# Patient Record
Sex: FEMALE | Race: WHITE | URBAN - METROPOLITAN AREA
[De-identification: names, ages, dates, MRNs, and addresses within clinical notes are randomized per-mention and may not be internally consistent; named-entity substitution may affect disease eponyms.]

---

## 2017-03-09 ENCOUNTER — HOSPITAL ENCOUNTER (OUTPATIENT)
Dept: OTHER | Age: 67
Discharge: HOME | End: 2017-03-09
Attending: OPHTHALMOLOGY | Admitting: OPHTHALMOLOGY

## 2017-03-09 VITALS
BODY MASS INDEX: 37.67 KG/M2 | HEIGHT: 67 IN | HEART RATE: 69 BPM | RESPIRATION RATE: 16 BRPM | DIASTOLIC BLOOD PRESSURE: 81 MMHG | WEIGHT: 240 LBS | SYSTOLIC BLOOD PRESSURE: 138 MMHG

## 2017-03-09 RX ORDER — SPIRONOLACTONE 25 MG/1
25 TABLET ORAL 2 TIMES DAILY
COMMUNITY

## 2017-03-09 RX ORDER — FUROSEMIDE 80 MG
80 TABLET ORAL 2 TIMES DAILY
COMMUNITY

## 2017-03-09 RX ORDER — SERTRALINE HYDROCHLORIDE 100 MG/1
100 TABLET, FILM COATED ORAL DAILY
COMMUNITY

## 2017-03-09 RX ORDER — ALLOPURINOL 300 MG/1
300 TABLET ORAL DAILY
COMMUNITY

## 2017-03-09 RX ORDER — CALCITRIOL 0.25 UG/1
0.25 CAPSULE, LIQUID FILLED ORAL DAILY
COMMUNITY

## 2017-03-09 RX ORDER — METOPROLOL SUCCINATE 100 MG/1
100 TABLET, EXTENDED RELEASE ORAL DAILY
COMMUNITY

## 2017-03-09 RX ORDER — OMEPRAZOLE 20 MG/1
20 CAPSULE, DELAYED RELEASE ORAL 2 TIMES DAILY
COMMUNITY

## 2017-03-09 RX ORDER — SOFT LENS RINSE,STORE SOLUTION
SOLUTION, NON-ORAL MISCELLANEOUS ONCE
Status: COMPLETED | OUTPATIENT
Start: 2017-03-09 | End: 2017-03-09

## 2017-03-09 RX ORDER — LEVOTHYROXINE SODIUM 0.05 MG/1
50 TABLET ORAL DAILY
COMMUNITY

## 2017-03-09 RX ORDER — PROPARACAINE HYDROCHLORIDE 5 MG/ML
1 SOLUTION/ DROPS OPHTHALMIC ONCE
Status: COMPLETED | OUTPATIENT
Start: 2017-03-09 | End: 2017-03-09

## 2017-03-09 RX ORDER — POTASSIUM CHLORIDE 1.5 G/1.77G
20 POWDER, FOR SOLUTION ORAL 2 TIMES DAILY
COMMUNITY

## 2017-03-09 RX ORDER — ERGOCALCIFEROL (VITAMIN D2) 1250 MCG
50000 CAPSULE ORAL
COMMUNITY

## 2017-03-09 RX ORDER — PHENYLEPHRINE HCL 2.5 %
1 DROPS OPHTHALMIC (EYE) ONCE
Status: COMPLETED | OUTPATIENT
Start: 2017-03-09 | End: 2017-03-09

## 2017-03-09 RX ORDER — APRACLONIDINE HYDROCHLORIDE 5 MG/ML
1 SOLUTION/ DROPS OPHTHALMIC 2 TIMES DAILY PRN
Status: DISCONTINUED | OUTPATIENT
Start: 2017-03-09 | End: 2017-03-10 | Stop reason: HOSPADM

## 2017-03-09 RX ORDER — ASPIRIN 325 MG
325 TABLET ORAL DAILY
COMMUNITY

## 2017-03-09 RX ORDER — TROPICAMIDE 10 MG/ML
1 SOLUTION/ DROPS OPHTHALMIC ONCE
Status: COMPLETED | OUTPATIENT
Start: 2017-03-09 | End: 2017-03-09

## 2017-03-09 RX ADMIN — Medication: at 12:52

## 2017-03-09 RX ADMIN — TROPICAMIDE 1 DROP: 10 SOLUTION/ DROPS OPHTHALMIC at 11:30

## 2017-03-09 RX ADMIN — APRACLONIDINE HYDROCHLORIDE 1 DROP: 5 SOLUTION/ DROPS OPHTHALMIC at 12:53

## 2017-03-09 RX ADMIN — APRACLONIDINE HYDROCHLORIDE 1 DROP: 5 SOLUTION/ DROPS OPHTHALMIC at 11:46

## 2017-03-09 RX ADMIN — PROPARACAINE HYDROCHLORIDE 1 DROP: 5 SOLUTION/ DROPS OPHTHALMIC at 12:50

## 2017-03-09 RX ADMIN — Medication 1 DROP: at 11:35

## 2017-03-09 ASSESSMENT — PAIN - FUNCTIONAL ASSESSMENT: PAIN_FUNCTIONAL_ASSESSMENT: 0-10

## 2017-03-09 ASSESSMENT — PAIN SCALES - GENERAL: PAINLEVEL_OUTOF10: 0

## 2017-03-15 ENCOUNTER — HOSPITAL ENCOUNTER (OUTPATIENT)
Dept: OTHER | Age: 67
Discharge: HOME | End: 2017-03-15
Attending: OPHTHALMOLOGY | Admitting: OPHTHALMOLOGY

## 2017-03-15 VITALS
RESPIRATION RATE: 16 BRPM | HEIGHT: 67 IN | DIASTOLIC BLOOD PRESSURE: 70 MMHG | WEIGHT: 240 LBS | HEART RATE: 64 BPM | BODY MASS INDEX: 37.67 KG/M2 | SYSTOLIC BLOOD PRESSURE: 148 MMHG

## 2017-03-15 RX ORDER — SOFT LENS RINSE,STORE SOLUTION
SOLUTION, NON-ORAL MISCELLANEOUS ONCE
Status: COMPLETED | OUTPATIENT
Start: 2017-03-15 | End: 2017-03-15

## 2017-03-15 RX ORDER — APRACLONIDINE HYDROCHLORIDE 5 MG/ML
1 SOLUTION/ DROPS OPHTHALMIC 2 TIMES DAILY PRN
Status: DISCONTINUED | OUTPATIENT
Start: 2017-03-15 | End: 2017-03-16 | Stop reason: HOSPADM

## 2017-03-15 RX ORDER — TROPICAMIDE 10 MG/ML
1 SOLUTION/ DROPS OPHTHALMIC ONCE
Status: COMPLETED | OUTPATIENT
Start: 2017-03-15 | End: 2017-03-15

## 2017-03-15 RX ORDER — PHENYLEPHRINE HCL 2.5 %
1 DROPS OPHTHALMIC (EYE) ONCE
Status: COMPLETED | OUTPATIENT
Start: 2017-03-15 | End: 2017-03-15

## 2017-03-15 RX ORDER — PROPARACAINE HYDROCHLORIDE 5 MG/ML
1 SOLUTION/ DROPS OPHTHALMIC ONCE
Status: COMPLETED | OUTPATIENT
Start: 2017-03-15 | End: 2017-03-15

## 2017-03-15 RX ADMIN — Medication 1 DROP: at 11:10

## 2017-03-15 RX ADMIN — PROPARACAINE HYDROCHLORIDE 1 DROP: 5 SOLUTION/ DROPS OPHTHALMIC at 12:50

## 2017-03-15 RX ADMIN — APRACLONIDINE HYDROCHLORIDE 1 DROP: 5 SOLUTION/ DROPS OPHTHALMIC at 11:12

## 2017-03-15 RX ADMIN — Medication: at 12:59

## 2017-03-15 RX ADMIN — APRACLONIDINE HYDROCHLORIDE 1 DROP: 5 SOLUTION/ DROPS OPHTHALMIC at 13:00

## 2017-03-15 RX ADMIN — TROPICAMIDE 1 DROP: 10 SOLUTION/ DROPS OPHTHALMIC at 11:07

## 2017-03-15 ASSESSMENT — PAIN - FUNCTIONAL ASSESSMENT
PAIN_FUNCTIONAL_ASSESSMENT: 0-10
PAIN_FUNCTIONAL_ASSESSMENT: 0-10
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
## 2023-06-14 ENCOUNTER — APPOINTMENT (OUTPATIENT)
Dept: CT IMAGING | Age: 73
DRG: 690 | End: 2023-06-14
Attending: INTERNAL MEDICINE
Payer: MEDICARE

## 2023-06-14 ENCOUNTER — APPOINTMENT (OUTPATIENT)
Dept: GENERAL RADIOLOGY | Age: 73
DRG: 690 | End: 2023-06-14
Attending: INTERNAL MEDICINE
Payer: MEDICARE

## 2023-06-14 ENCOUNTER — HOSPITAL ENCOUNTER (INPATIENT)
Age: 73
LOS: 5 days | Discharge: NURSING, REHAB OR LONG-TERM CARE | DRG: 690 | End: 2023-06-19
Attending: INTERNAL MEDICINE | Admitting: INTERNAL MEDICINE
Payer: MEDICARE

## 2023-06-14 DIAGNOSIS — S42.335A CLOSED NONDISPLACED OBLIQUE FRACTURE OF SHAFT OF LEFT HUMERUS, INITIAL ENCOUNTER: Primary | ICD-10-CM

## 2023-06-14 PROBLEM — N39.0 UTI (URINARY TRACT INFECTION): Status: ACTIVE | Noted: 2023-06-14

## 2023-06-14 LAB
ALBUMIN SERPL-MCNC: 4.2 G/DL (ref 3.4–5)
ALP SERPL-CCNC: 66 U/L (ref 40–129)
ALT SERPL-CCNC: 16 U/L (ref 10–40)
AMPHETAMINES UR QL SCN>1000 NG/ML: ABNORMAL
ANION GAP SERPL CALCULATED.3IONS-SCNC: 11 MMOL/L (ref 3–16)
AST SERPL-CCNC: 23 U/L (ref 15–37)
BACTERIA URNS QL MICRO: ABNORMAL /HPF
BARBITURATES UR QL SCN>200 NG/ML: ABNORMAL
BASE EXCESS BLDV CALC-SCNC: 2.8 MMOL/L (ref -3–3)
BASOPHILS # BLD: 0 K/UL (ref 0–0.2)
BASOPHILS NFR BLD: 0.3 %
BENZODIAZ UR QL SCN>200 NG/ML: ABNORMAL
BILIRUB DIRECT SERPL-MCNC: <0.2 MG/DL (ref 0–0.3)
BILIRUB INDIRECT SERPL-MCNC: NORMAL MG/DL (ref 0–1)
BILIRUB SERPL-MCNC: 0.6 MG/DL (ref 0–1)
BILIRUB UR QL STRIP.AUTO: NEGATIVE
BUN SERPL-MCNC: 49 MG/DL (ref 7–20)
CALCIUM SERPL-MCNC: 10.4 MG/DL (ref 8.3–10.6)
CANNABINOIDS UR QL SCN>50 NG/ML: ABNORMAL
CHLORIDE SERPL-SCNC: 100 MMOL/L (ref 99–110)
CLARITY UR: ABNORMAL
CO2 BLDV-SCNC: 67 MMOL/L
CO2 SERPL-SCNC: 27 MMOL/L (ref 21–32)
COCAINE UR QL SCN: ABNORMAL
COHGB MFR BLDV: 4.7 % (ref 0–1.5)
COLOR UR: YELLOW
CREAT SERPL-MCNC: 1.8 MG/DL (ref 0.6–1.2)
DEPRECATED RDW RBC AUTO: 14.8 % (ref 12.4–15.4)
DRUG SCREEN COMMENT UR-IMP: ABNORMAL
EKG ATRIAL RATE: 86 BPM
EKG DIAGNOSIS: NORMAL
EKG P AXIS: 75 DEGREES
EKG P-R INTERVAL: 182 MS
EKG Q-T INTERVAL: 410 MS
EKG QRS DURATION: 160 MS
EKG QTC CALCULATION (BAZETT): 490 MS
EKG R AXIS: -44 DEGREES
EKG T AXIS: 114 DEGREES
EKG VENTRICULAR RATE: 86 BPM
EOSINOPHIL # BLD: 0.1 K/UL (ref 0–0.6)
EOSINOPHIL NFR BLD: 1.2 %
EPI CELLS #/AREA URNS AUTO: 2 /HPF (ref 0–5)
FENTANYL SCREEN, URINE: POSITIVE
GFR SERPLBLD CREATININE-BSD FMLA CKD-EPI: 30 ML/MIN/{1.73_M2}
GLUCOSE BLD-MCNC: 123 MG/DL (ref 70–99)
GLUCOSE BLD-MCNC: 124 MG/DL (ref 70–99)
GLUCOSE BLD-MCNC: 154 MG/DL (ref 70–99)
GLUCOSE BLD-MCNC: 156 MG/DL (ref 70–99)
GLUCOSE BLD-MCNC: 211 MG/DL (ref 70–99)
GLUCOSE SERPL-MCNC: 119 MG/DL (ref 70–99)
GLUCOSE UR STRIP.AUTO-MCNC: NEGATIVE MG/DL
HCO3 BLDV-SCNC: 28.5 MMOL/L (ref 23–29)
HCT VFR BLD AUTO: 37.1 % (ref 36–48)
HGB BLD-MCNC: 12.2 G/DL (ref 12–16)
HGB UR QL STRIP.AUTO: NEGATIVE
HYALINE CASTS #/AREA URNS AUTO: 8 /LPF (ref 0–8)
INR PPP: 1.05 (ref 0.84–1.16)
KETONES UR STRIP.AUTO-MCNC: NEGATIVE MG/DL
LEUKOCYTE ESTERASE UR QL STRIP.AUTO: ABNORMAL
LIPASE SERPL-CCNC: 22 U/L (ref 13–60)
LYMPHOCYTES # BLD: 0.8 K/UL (ref 1–5.1)
LYMPHOCYTES NFR BLD: 8.2 %
MAGNESIUM SERPL-MCNC: 2.1 MG/DL (ref 1.8–2.4)
MCH RBC QN AUTO: 34.2 PG (ref 26–34)
MCHC RBC AUTO-ENTMCNC: 33 G/DL (ref 31–36)
MCV RBC AUTO: 103.9 FL (ref 80–100)
METHADONE UR QL SCN>300 NG/ML: ABNORMAL
METHGB MFR BLDV: 0.4 %
MONOCYTES # BLD: 0.3 K/UL (ref 0–1.3)
MONOCYTES NFR BLD: 2.7 %
NEUTROPHILS # BLD: 8.5 K/UL (ref 1.7–7.7)
NEUTROPHILS NFR BLD: 87.6 %
NITRITE UR QL STRIP.AUTO: NEGATIVE
O2 CT VFR BLDV CALC: 17 VOL %
O2 THERAPY: ABNORMAL
OPIATES UR QL SCN>300 NG/ML: POSITIVE
OXYCODONE UR QL SCN: ABNORMAL
PCO2 BLDV: 47.3 MMHG (ref 40–50)
PCP UR QL SCN>25 NG/ML: ABNORMAL
PERFORMED ON: ABNORMAL
PH BLDV: 7.39 [PH] (ref 7.35–7.45)
PH UR STRIP.AUTO: 5.5 [PH] (ref 5–8)
PH UR STRIP: 5.5 [PH]
PHOSPHATE SERPL-MCNC: 3.8 MG/DL (ref 2.5–4.9)
PLATELET # BLD AUTO: 120 K/UL (ref 135–450)
PMV BLD AUTO: 10.1 FL (ref 5–10.5)
PO2 BLDV: 160 MMHG (ref 25–40)
POTASSIUM SERPL-SCNC: 3.9 MMOL/L (ref 3.5–5.1)
PROT SERPL-MCNC: 7.2 G/DL (ref 6.4–8.2)
PROT UR STRIP.AUTO-MCNC: NEGATIVE MG/DL
PROTHROMBIN TIME: 13.7 SEC (ref 11.5–14.8)
RBC # BLD AUTO: 3.57 M/UL (ref 4–5.2)
RBC CLUMPS #/AREA URNS AUTO: 0 /HPF (ref 0–4)
SAO2 % BLDV: 100 %
SODIUM SERPL-SCNC: 138 MMOL/L (ref 136–145)
SP GR UR STRIP.AUTO: 1.01 (ref 1–1.03)
UA DIPSTICK W REFLEX MICRO PNL UR: YES
URN SPEC COLLECT METH UR: ABNORMAL
UROBILINOGEN UR STRIP-ACNC: 0.2 E.U./DL
WBC # BLD AUTO: 9.7 K/UL (ref 4–11)
WBC #/AREA URNS AUTO: 110 /HPF (ref 0–5)

## 2023-06-14 PROCEDURE — 81001 URINALYSIS AUTO W/SCOPE: CPT

## 2023-06-14 PROCEDURE — 80069 RENAL FUNCTION PANEL: CPT

## 2023-06-14 PROCEDURE — 71045 X-RAY EXAM CHEST 1 VIEW: CPT

## 2023-06-14 PROCEDURE — 82803 BLOOD GASES ANY COMBINATION: CPT

## 2023-06-14 PROCEDURE — 85025 COMPLETE CBC W/AUTO DIFF WBC: CPT

## 2023-06-14 PROCEDURE — 93005 ELECTROCARDIOGRAM TRACING: CPT | Performed by: INTERNAL MEDICINE

## 2023-06-14 PROCEDURE — 1200000000 HC SEMI PRIVATE

## 2023-06-14 PROCEDURE — 6360000002 HC RX W HCPCS: Performed by: INTERNAL MEDICINE

## 2023-06-14 PROCEDURE — 87040 BLOOD CULTURE FOR BACTERIA: CPT

## 2023-06-14 PROCEDURE — 84443 ASSAY THYROID STIM HORMONE: CPT

## 2023-06-14 PROCEDURE — 6370000000 HC RX 637 (ALT 250 FOR IP): Performed by: INTERNAL MEDICINE

## 2023-06-14 PROCEDURE — 83690 ASSAY OF LIPASE: CPT

## 2023-06-14 PROCEDURE — 87493 C DIFF AMPLIFIED PROBE: CPT

## 2023-06-14 PROCEDURE — 93010 ELECTROCARDIOGRAM REPORT: CPT | Performed by: INTERNAL MEDICINE

## 2023-06-14 PROCEDURE — 6370000000 HC RX 637 (ALT 250 FOR IP): Performed by: NURSE PRACTITIONER

## 2023-06-14 PROCEDURE — 80307 DRUG TEST PRSMV CHEM ANLYZR: CPT

## 2023-06-14 PROCEDURE — 85610 PROTHROMBIN TIME: CPT

## 2023-06-14 PROCEDURE — 99222 1ST HOSP IP/OBS MODERATE 55: CPT | Performed by: NURSE PRACTITIONER

## 2023-06-14 PROCEDURE — 6370000000 HC RX 637 (ALT 250 FOR IP): Performed by: HOSPITALIST

## 2023-06-14 PROCEDURE — 73200 CT UPPER EXTREMITY W/O DYE: CPT

## 2023-06-14 PROCEDURE — 72040 X-RAY EXAM NECK SPINE 2-3 VW: CPT

## 2023-06-14 PROCEDURE — 80076 HEPATIC FUNCTION PANEL: CPT

## 2023-06-14 PROCEDURE — 73030 X-RAY EXAM OF SHOULDER: CPT

## 2023-06-14 PROCEDURE — 73060 X-RAY EXAM OF HUMERUS: CPT

## 2023-06-14 PROCEDURE — 72128 CT CHEST SPINE W/O DYE: CPT

## 2023-06-14 PROCEDURE — 2500000003 HC RX 250 WO HCPCS: Performed by: INTERNAL MEDICINE

## 2023-06-14 PROCEDURE — 2580000003 HC RX 258: Performed by: INTERNAL MEDICINE

## 2023-06-14 PROCEDURE — 72070 X-RAY EXAM THORAC SPINE 2VWS: CPT

## 2023-06-14 PROCEDURE — 83735 ASSAY OF MAGNESIUM: CPT

## 2023-06-14 RX ORDER — PANTOPRAZOLE SODIUM 40 MG/1
40 TABLET, DELAYED RELEASE ORAL
Status: DISCONTINUED | OUTPATIENT
Start: 2023-06-14 | End: 2023-06-19 | Stop reason: HOSPADM

## 2023-06-14 RX ORDER — ACETAMINOPHEN 325 MG/1
650 TABLET ORAL 3 TIMES DAILY
Status: DISCONTINUED | OUTPATIENT
Start: 2023-06-14 | End: 2023-06-19 | Stop reason: HOSPADM

## 2023-06-14 RX ORDER — ACETAMINOPHEN 500 MG
1000 TABLET ORAL EVERY 8 HOURS
Status: DISCONTINUED | OUTPATIENT
Start: 2023-06-14 | End: 2023-06-14

## 2023-06-14 RX ORDER — ATORVASTATIN CALCIUM 20 MG/1
20 TABLET, FILM COATED ORAL NIGHTLY
Status: DISCONTINUED | OUTPATIENT
Start: 2023-06-14 | End: 2023-06-19 | Stop reason: HOSPADM

## 2023-06-14 RX ORDER — METHOCARBAMOL 500 MG/1
750 TABLET, FILM COATED ORAL 4 TIMES DAILY
Status: DISCONTINUED | OUTPATIENT
Start: 2023-06-14 | End: 2023-06-19 | Stop reason: HOSPADM

## 2023-06-14 RX ORDER — METOPROLOL SUCCINATE 50 MG/1
100 TABLET, EXTENDED RELEASE ORAL DAILY
Status: DISCONTINUED | OUTPATIENT
Start: 2023-06-14 | End: 2023-06-19 | Stop reason: HOSPADM

## 2023-06-14 RX ORDER — GLUCAGON 1 MG/ML
1 KIT INJECTION PRN
Status: DISCONTINUED | OUTPATIENT
Start: 2023-06-14 | End: 2023-06-19 | Stop reason: HOSPADM

## 2023-06-14 RX ORDER — ASPIRIN 325 MG
325 TABLET ORAL NIGHTLY
Status: DISCONTINUED | OUTPATIENT
Start: 2023-06-14 | End: 2023-06-19 | Stop reason: HOSPADM

## 2023-06-14 RX ORDER — GLIMEPIRIDE 1 MG/1
1 TABLET ORAL
COMMUNITY

## 2023-06-14 RX ORDER — LEVOTHYROXINE SODIUM 88 UG/1
88 TABLET ORAL DAILY
Status: DISCONTINUED | OUTPATIENT
Start: 2023-06-14 | End: 2023-06-19 | Stop reason: HOSPADM

## 2023-06-14 RX ORDER — SPIRONOLACTONE 25 MG/1
25 TABLET ORAL 2 TIMES DAILY
Status: DISCONTINUED | OUTPATIENT
Start: 2023-06-14 | End: 2023-06-14

## 2023-06-14 RX ORDER — ALLOPURINOL 100 MG/1
100 TABLET ORAL DAILY
Status: DISCONTINUED | OUTPATIENT
Start: 2023-06-15 | End: 2023-06-19 | Stop reason: HOSPADM

## 2023-06-14 RX ORDER — FENOFIBRATE 160 MG/1
160 TABLET ORAL DAILY
Status: DISCONTINUED | OUTPATIENT
Start: 2023-06-14 | End: 2023-06-19 | Stop reason: HOSPADM

## 2023-06-14 RX ORDER — OXYCODONE HYDROCHLORIDE 5 MG/1
5 TABLET ORAL EVERY 4 HOURS PRN
Status: DISCONTINUED | OUTPATIENT
Start: 2023-06-14 | End: 2023-06-19 | Stop reason: HOSPADM

## 2023-06-14 RX ORDER — LORAZEPAM 1 MG/1
1 TABLET ORAL EVERY 4 HOURS PRN
Status: DISCONTINUED | OUTPATIENT
Start: 2023-06-14 | End: 2023-06-19 | Stop reason: HOSPADM

## 2023-06-14 RX ORDER — FERROUS SULFATE 325(65) MG
325 TABLET ORAL
COMMUNITY

## 2023-06-14 RX ORDER — NICOTINE 21 MG/24HR
1 PATCH, TRANSDERMAL 24 HOURS TRANSDERMAL DAILY
Status: DISCONTINUED | OUTPATIENT
Start: 2023-06-14 | End: 2023-06-19 | Stop reason: HOSPADM

## 2023-06-14 RX ORDER — DEXTROSE MONOHYDRATE 100 MG/ML
INJECTION, SOLUTION INTRAVENOUS CONTINUOUS PRN
Status: DISCONTINUED | OUTPATIENT
Start: 2023-06-14 | End: 2023-06-19 | Stop reason: HOSPADM

## 2023-06-14 RX ORDER — ALLOPURINOL 300 MG/1
300 TABLET ORAL DAILY
Status: DISCONTINUED | OUTPATIENT
Start: 2023-06-14 | End: 2023-06-14

## 2023-06-14 RX ORDER — INSULIN LISPRO 100 [IU]/ML
0-8 INJECTION, SOLUTION INTRAVENOUS; SUBCUTANEOUS
Status: DISCONTINUED | OUTPATIENT
Start: 2023-06-14 | End: 2023-06-19 | Stop reason: HOSPADM

## 2023-06-14 RX ORDER — INSULIN LISPRO 100 [IU]/ML
0-4 INJECTION, SOLUTION INTRAVENOUS; SUBCUTANEOUS NIGHTLY
Status: DISCONTINUED | OUTPATIENT
Start: 2023-06-14 | End: 2023-06-19 | Stop reason: HOSPADM

## 2023-06-14 RX ADMIN — LEVOTHYROXINE SODIUM 88 MCG: 0.09 TABLET ORAL at 06:49

## 2023-06-14 RX ADMIN — SERTRALINE 50 MG: 50 TABLET, FILM COATED ORAL at 12:34

## 2023-06-14 RX ADMIN — FOLIC ACID: 5 INJECTION, SOLUTION INTRAMUSCULAR; INTRAVENOUS; SUBCUTANEOUS at 12:39

## 2023-06-14 RX ADMIN — PANTOPRAZOLE SODIUM 40 MG: 40 TABLET, DELAYED RELEASE ORAL at 06:49

## 2023-06-14 RX ADMIN — PANTOPRAZOLE SODIUM 40 MG: 40 TABLET, DELAYED RELEASE ORAL at 18:00

## 2023-06-14 RX ADMIN — ACETAMINOPHEN 1000 MG: 500 TABLET ORAL at 14:16

## 2023-06-14 RX ADMIN — FENOFIBRATE 160 MG: 160 TABLET ORAL at 12:34

## 2023-06-14 RX ADMIN — CEFTRIAXONE SODIUM 1000 MG: 1 INJECTION, POWDER, FOR SOLUTION INTRAMUSCULAR; INTRAVENOUS at 06:50

## 2023-06-14 RX ADMIN — METHOCARBAMOL 750 MG: 500 TABLET ORAL at 21:14

## 2023-06-14 RX ADMIN — SPIRONOLACTONE 25 MG: 25 TABLET ORAL at 12:34

## 2023-06-14 RX ADMIN — ASPIRIN 325 MG: 325 TABLET ORAL at 21:14

## 2023-06-14 RX ADMIN — METOPROLOL SUCCINATE 100 MG: 50 TABLET, EXTENDED RELEASE ORAL at 12:34

## 2023-06-14 RX ADMIN — METHOCARBAMOL 750 MG: 500 TABLET ORAL at 18:00

## 2023-06-14 RX ADMIN — ACETAMINOPHEN 650 MG: 325 TABLET ORAL at 18:00

## 2023-06-14 ASSESSMENT — PAIN SCALES - GENERAL
PAINLEVEL_OUTOF10: 6
PAINLEVEL_OUTOF10: 7

## 2023-06-15 LAB
ANION GAP SERPL CALCULATED.3IONS-SCNC: 7 MMOL/L (ref 3–16)
BUN SERPL-MCNC: 53 MG/DL (ref 7–20)
C DIFF TOX A+B STL QL IA: NORMAL
CALCIUM SERPL-MCNC: 10 MG/DL (ref 8.3–10.6)
CHLORIDE SERPL-SCNC: 101 MMOL/L (ref 99–110)
CO2 SERPL-SCNC: 30 MMOL/L (ref 21–32)
CREAT SERPL-MCNC: 1.6 MG/DL (ref 0.6–1.2)
DEPRECATED RDW RBC AUTO: 14.9 % (ref 12.4–15.4)
GFR SERPLBLD CREATININE-BSD FMLA CKD-EPI: 34 ML/MIN/{1.73_M2}
GLUCOSE BLD-MCNC: 114 MG/DL (ref 70–99)
GLUCOSE BLD-MCNC: 133 MG/DL (ref 70–99)
GLUCOSE BLD-MCNC: 155 MG/DL (ref 70–99)
GLUCOSE BLD-MCNC: 220 MG/DL (ref 70–99)
GLUCOSE SERPL-MCNC: 170 MG/DL (ref 70–99)
HCT VFR BLD AUTO: 32.8 % (ref 36–48)
HGB BLD-MCNC: 10.8 G/DL (ref 12–16)
MCH RBC QN AUTO: 34.4 PG (ref 26–34)
MCHC RBC AUTO-ENTMCNC: 33 G/DL (ref 31–36)
MCV RBC AUTO: 104.2 FL (ref 80–100)
PERFORMED ON: ABNORMAL
PLATELET # BLD AUTO: 113 K/UL (ref 135–450)
PMV BLD AUTO: 10 FL (ref 5–10.5)
POTASSIUM SERPL-SCNC: 4 MMOL/L (ref 3.5–5.1)
RBC # BLD AUTO: 3.15 M/UL (ref 4–5.2)
SODIUM SERPL-SCNC: 138 MMOL/L (ref 136–145)
TSH SERPL DL<=0.005 MIU/L-ACNC: 2.84 UIU/ML (ref 0.27–4.2)
WBC # BLD AUTO: 6.7 K/UL (ref 4–11)

## 2023-06-15 PROCEDURE — 97530 THERAPEUTIC ACTIVITIES: CPT

## 2023-06-15 PROCEDURE — 36415 COLL VENOUS BLD VENIPUNCTURE: CPT

## 2023-06-15 PROCEDURE — 85027 COMPLETE CBC AUTOMATED: CPT

## 2023-06-15 PROCEDURE — 80048 BASIC METABOLIC PNL TOTAL CA: CPT

## 2023-06-15 PROCEDURE — 6370000000 HC RX 637 (ALT 250 FOR IP): Performed by: NURSE PRACTITIONER

## 2023-06-15 PROCEDURE — 6370000000 HC RX 637 (ALT 250 FOR IP): Performed by: INTERNAL MEDICINE

## 2023-06-15 PROCEDURE — 87449 NOS EACH ORGANISM AG IA: CPT

## 2023-06-15 PROCEDURE — 97116 GAIT TRAINING THERAPY: CPT

## 2023-06-15 PROCEDURE — 87506 IADNA-DNA/RNA PROBE TQ 6-11: CPT

## 2023-06-15 PROCEDURE — 97166 OT EVAL MOD COMPLEX 45 MIN: CPT

## 2023-06-15 PROCEDURE — 87324 CLOSTRIDIUM AG IA: CPT

## 2023-06-15 PROCEDURE — 1200000000 HC SEMI PRIVATE

## 2023-06-15 PROCEDURE — 97535 SELF CARE MNGMENT TRAINING: CPT

## 2023-06-15 PROCEDURE — 99233 SBSQ HOSP IP/OBS HIGH 50: CPT | Performed by: NURSE PRACTITIONER

## 2023-06-15 PROCEDURE — 97162 PT EVAL MOD COMPLEX 30 MIN: CPT

## 2023-06-15 PROCEDURE — 6370000000 HC RX 637 (ALT 250 FOR IP): Performed by: HOSPITALIST

## 2023-06-15 RX ORDER — GUAIFENESIN 600 MG/1
600 TABLET, EXTENDED RELEASE ORAL 2 TIMES DAILY
Qty: 14 TABLET | Refills: 0 | Status: SHIPPED | OUTPATIENT
Start: 2023-06-15 | End: 2023-06-22

## 2023-06-15 RX ORDER — METHOCARBAMOL 750 MG/1
750 TABLET, FILM COATED ORAL 4 TIMES DAILY
Qty: 20 TABLET | Refills: 0 | Status: SHIPPED | OUTPATIENT
Start: 2023-06-15 | End: 2023-06-20

## 2023-06-15 RX ORDER — HYDROXYZINE HYDROCHLORIDE 10 MG/1
10 TABLET, FILM COATED ORAL 3 TIMES DAILY PRN
Status: DISCONTINUED | OUTPATIENT
Start: 2023-06-15 | End: 2023-06-19 | Stop reason: HOSPADM

## 2023-06-15 RX ORDER — DOXYCYCLINE HYCLATE 100 MG
100 TABLET ORAL 2 TIMES DAILY
Qty: 14 TABLET | Refills: 0 | Status: SHIPPED | OUTPATIENT
Start: 2023-06-15 | End: 2023-06-19 | Stop reason: HOSPADM

## 2023-06-15 RX ORDER — OXYCODONE HYDROCHLORIDE 5 MG/1
5 TABLET ORAL EVERY 8 HOURS PRN
Qty: 9 TABLET | Refills: 0 | Status: SHIPPED | OUTPATIENT
Start: 2023-06-15 | End: 2023-06-18

## 2023-06-15 RX ORDER — ASPIRIN 81 MG/1
81 TABLET, CHEWABLE ORAL DAILY
Qty: 30 TABLET | Refills: 3 | Status: SHIPPED | OUTPATIENT
Start: 2023-06-15

## 2023-06-15 RX ADMIN — ACETAMINOPHEN 650 MG: 325 TABLET ORAL at 10:21

## 2023-06-15 RX ADMIN — ALLOPURINOL 100 MG: 100 TABLET ORAL at 10:23

## 2023-06-15 RX ADMIN — SERTRALINE 50 MG: 50 TABLET, FILM COATED ORAL at 10:22

## 2023-06-15 RX ADMIN — HYDROXYZINE HYDROCHLORIDE 10 MG: 10 TABLET ORAL at 00:22

## 2023-06-15 RX ADMIN — ACETAMINOPHEN 650 MG: 325 TABLET ORAL at 01:30

## 2023-06-15 RX ADMIN — ATORVASTATIN CALCIUM 20 MG: 20 TABLET, FILM COATED ORAL at 21:24

## 2023-06-15 RX ADMIN — OXYCODONE HYDROCHLORIDE 5 MG: 5 TABLET ORAL at 21:24

## 2023-06-15 RX ADMIN — METOPROLOL SUCCINATE 100 MG: 50 TABLET, EXTENDED RELEASE ORAL at 10:21

## 2023-06-15 RX ADMIN — FENOFIBRATE 160 MG: 160 TABLET ORAL at 10:23

## 2023-06-15 RX ADMIN — METHOCARBAMOL 750 MG: 500 TABLET ORAL at 16:39

## 2023-06-15 RX ADMIN — METHOCARBAMOL 750 MG: 500 TABLET ORAL at 10:22

## 2023-06-15 RX ADMIN — PANTOPRAZOLE SODIUM 40 MG: 40 TABLET, DELAYED RELEASE ORAL at 16:39

## 2023-06-15 RX ADMIN — ATORVASTATIN CALCIUM 20 MG: 20 TABLET, FILM COATED ORAL at 01:38

## 2023-06-15 RX ADMIN — METHOCARBAMOL 750 MG: 500 TABLET ORAL at 12:29

## 2023-06-15 RX ADMIN — METHOCARBAMOL 750 MG: 500 TABLET ORAL at 21:24

## 2023-06-15 RX ADMIN — OXYCODONE HYDROCHLORIDE 5 MG: 5 TABLET ORAL at 16:39

## 2023-06-15 RX ADMIN — ASPIRIN 325 MG: 325 TABLET ORAL at 21:24

## 2023-06-15 RX ADMIN — PANTOPRAZOLE SODIUM 40 MG: 40 TABLET, DELAYED RELEASE ORAL at 06:00

## 2023-06-15 RX ADMIN — ACETAMINOPHEN 650 MG: 325 TABLET ORAL at 16:39

## 2023-06-15 RX ADMIN — OXYCODONE HYDROCHLORIDE 5 MG: 5 TABLET ORAL at 10:20

## 2023-06-15 RX ADMIN — HYDROXYZINE HYDROCHLORIDE 10 MG: 10 TABLET ORAL at 16:39

## 2023-06-15 RX ADMIN — LEVOTHYROXINE SODIUM 88 MCG: 0.09 TABLET ORAL at 06:00

## 2023-06-15 RX ADMIN — HYDROXYZINE HYDROCHLORIDE 10 MG: 10 TABLET ORAL at 10:33

## 2023-06-15 ASSESSMENT — PAIN SCALES - GENERAL
PAINLEVEL_OUTOF10: 6
PAINLEVEL_OUTOF10: 2

## 2023-06-15 ASSESSMENT — PAIN DESCRIPTION - ORIENTATION: ORIENTATION: LEFT

## 2023-06-15 ASSESSMENT — PAIN DESCRIPTION - PAIN TYPE: TYPE: ACUTE PAIN

## 2023-06-15 ASSESSMENT — PAIN DESCRIPTION - DESCRIPTORS: DESCRIPTORS: SHARP;SHOOTING

## 2023-06-15 ASSESSMENT — PAIN DESCRIPTION - ONSET: ONSET: GRADUAL

## 2023-06-15 ASSESSMENT — PAIN DESCRIPTION - FREQUENCY: FREQUENCY: INTERMITTENT

## 2023-06-15 ASSESSMENT — PAIN - FUNCTIONAL ASSESSMENT: PAIN_FUNCTIONAL_ASSESSMENT: ACTIVITIES ARE NOT PREVENTED

## 2023-06-15 ASSESSMENT — PAIN DESCRIPTION - LOCATION: LOCATION: ARM

## 2023-06-16 LAB
ALBUMIN SERPL-MCNC: 3.5 G/DL (ref 3.4–5)
ANION GAP SERPL CALCULATED.3IONS-SCNC: 8 MMOL/L (ref 3–16)
BUN SERPL-MCNC: 49 MG/DL (ref 7–20)
C DIFF TOX GENS STL QL NAA+PROBE: ABNORMAL
CALCIUM SERPL-MCNC: 9.9 MG/DL (ref 8.3–10.6)
CHLORIDE SERPL-SCNC: 103 MMOL/L (ref 99–110)
CO2 SERPL-SCNC: 28 MMOL/L (ref 21–32)
CREAT SERPL-MCNC: 1.3 MG/DL (ref 0.6–1.2)
DEPRECATED RDW RBC AUTO: 14.9 % (ref 12.4–15.4)
GFR SERPLBLD CREATININE-BSD FMLA CKD-EPI: 44 ML/MIN/{1.73_M2}
GI PATHOGENS PNL STL NAA+PROBE: NORMAL
GLUCOSE BLD-MCNC: 119 MG/DL (ref 70–99)
GLUCOSE BLD-MCNC: 150 MG/DL (ref 70–99)
GLUCOSE BLD-MCNC: 199 MG/DL (ref 70–99)
GLUCOSE BLD-MCNC: 236 MG/DL (ref 70–99)
GLUCOSE SERPL-MCNC: 111 MG/DL (ref 70–99)
HCT VFR BLD AUTO: 31.5 % (ref 36–48)
HGB BLD-MCNC: 10.7 G/DL (ref 12–16)
MCH RBC QN AUTO: 34.9 PG (ref 26–34)
MCHC RBC AUTO-ENTMCNC: 34 G/DL (ref 31–36)
MCV RBC AUTO: 102.6 FL (ref 80–100)
ORGANISM: ABNORMAL
PERFORMED ON: ABNORMAL
PHOSPHATE SERPL-MCNC: 3.7 MG/DL (ref 2.5–4.9)
PLATELET # BLD AUTO: 97 K/UL (ref 135–450)
PMV BLD AUTO: 10 FL (ref 5–10.5)
POTASSIUM SERPL-SCNC: 4 MMOL/L (ref 3.5–5.1)
RBC # BLD AUTO: 3.07 M/UL (ref 4–5.2)
SODIUM SERPL-SCNC: 139 MMOL/L (ref 136–145)
WBC # BLD AUTO: 5.4 K/UL (ref 4–11)

## 2023-06-16 PROCEDURE — 6370000000 HC RX 637 (ALT 250 FOR IP): Performed by: NURSE PRACTITIONER

## 2023-06-16 PROCEDURE — 97530 THERAPEUTIC ACTIVITIES: CPT

## 2023-06-16 PROCEDURE — 36415 COLL VENOUS BLD VENIPUNCTURE: CPT

## 2023-06-16 PROCEDURE — 6370000000 HC RX 637 (ALT 250 FOR IP): Performed by: INTERNAL MEDICINE

## 2023-06-16 PROCEDURE — 97535 SELF CARE MNGMENT TRAINING: CPT

## 2023-06-16 PROCEDURE — 85027 COMPLETE CBC AUTOMATED: CPT

## 2023-06-16 PROCEDURE — 6370000000 HC RX 637 (ALT 250 FOR IP): Performed by: HOSPITALIST

## 2023-06-16 PROCEDURE — 6370000000 HC RX 637 (ALT 250 FOR IP): Performed by: PHYSICIAN ASSISTANT

## 2023-06-16 PROCEDURE — 1200000000 HC SEMI PRIVATE

## 2023-06-16 PROCEDURE — 80069 RENAL FUNCTION PANEL: CPT

## 2023-06-16 RX ORDER — VANCOMYCIN HYDROCHLORIDE 50 MG/ML
125 KIT ORAL EVERY 6 HOURS SCHEDULED
Status: DISCONTINUED | OUTPATIENT
Start: 2023-06-16 | End: 2023-06-16

## 2023-06-16 RX ORDER — VANCOMYCIN HYDROCHLORIDE 50 MG/ML
125 KIT ORAL EVERY 6 HOURS SCHEDULED
Status: DISCONTINUED | OUTPATIENT
Start: 2023-06-16 | End: 2023-06-19 | Stop reason: HOSPADM

## 2023-06-16 RX ORDER — LIDOCAINE 4 G/G
1 PATCH TOPICAL DAILY
Status: DISCONTINUED | OUTPATIENT
Start: 2023-06-16 | End: 2023-06-17

## 2023-06-16 RX ADMIN — SERTRALINE 50 MG: 50 TABLET, FILM COATED ORAL at 10:05

## 2023-06-16 RX ADMIN — Medication 125 MG: at 13:34

## 2023-06-16 RX ADMIN — METHOCARBAMOL 750 MG: 500 TABLET ORAL at 21:14

## 2023-06-16 RX ADMIN — METOPROLOL SUCCINATE 100 MG: 50 TABLET, EXTENDED RELEASE ORAL at 10:05

## 2023-06-16 RX ADMIN — ACETAMINOPHEN 650 MG: 325 TABLET ORAL at 18:09

## 2023-06-16 RX ADMIN — PANTOPRAZOLE SODIUM 40 MG: 40 TABLET, DELAYED RELEASE ORAL at 06:11

## 2023-06-16 RX ADMIN — METHOCARBAMOL 750 MG: 500 TABLET ORAL at 13:27

## 2023-06-16 RX ADMIN — PANTOPRAZOLE SODIUM 40 MG: 40 TABLET, DELAYED RELEASE ORAL at 18:09

## 2023-06-16 RX ADMIN — METHOCARBAMOL 750 MG: 500 TABLET ORAL at 10:06

## 2023-06-16 RX ADMIN — OXYCODONE HYDROCHLORIDE 5 MG: 5 TABLET ORAL at 10:05

## 2023-06-16 RX ADMIN — ATORVASTATIN CALCIUM 20 MG: 20 TABLET, FILM COATED ORAL at 21:14

## 2023-06-16 RX ADMIN — ACETAMINOPHEN 650 MG: 325 TABLET ORAL at 10:06

## 2023-06-16 RX ADMIN — LEVOTHYROXINE SODIUM 88 MCG: 0.09 TABLET ORAL at 06:12

## 2023-06-16 RX ADMIN — ACETAMINOPHEN 650 MG: 325 TABLET ORAL at 01:58

## 2023-06-16 RX ADMIN — FENOFIBRATE 160 MG: 160 TABLET ORAL at 10:14

## 2023-06-16 RX ADMIN — METHOCARBAMOL 750 MG: 500 TABLET ORAL at 18:09

## 2023-06-16 RX ADMIN — Medication 125 MG: at 18:09

## 2023-06-16 RX ADMIN — ALLOPURINOL 100 MG: 100 TABLET ORAL at 10:05

## 2023-06-16 RX ADMIN — OXYCODONE HYDROCHLORIDE 5 MG: 5 TABLET ORAL at 18:09

## 2023-06-16 RX ADMIN — ASPIRIN 325 MG: 325 TABLET ORAL at 21:14

## 2023-06-16 ASSESSMENT — PAIN - FUNCTIONAL ASSESSMENT: PAIN_FUNCTIONAL_ASSESSMENT: PREVENTS OR INTERFERES SOME ACTIVE ACTIVITIES AND ADLS

## 2023-06-16 ASSESSMENT — PAIN DESCRIPTION - FREQUENCY: FREQUENCY: CONTINUOUS

## 2023-06-16 ASSESSMENT — PAIN DESCRIPTION - PAIN TYPE: TYPE: ACUTE PAIN

## 2023-06-16 ASSESSMENT — PAIN SCALES - GENERAL
PAINLEVEL_OUTOF10: 5
PAINLEVEL_OUTOF10: 10

## 2023-06-16 ASSESSMENT — PAIN DESCRIPTION - LOCATION: LOCATION: BACK;ARM

## 2023-06-16 ASSESSMENT — PAIN DESCRIPTION - ONSET: ONSET: ON-GOING

## 2023-06-16 ASSESSMENT — PAIN DESCRIPTION - ORIENTATION: ORIENTATION: LEFT

## 2023-06-17 ENCOUNTER — APPOINTMENT (OUTPATIENT)
Dept: CT IMAGING | Age: 73
DRG: 690 | End: 2023-06-17
Attending: INTERNAL MEDICINE
Payer: MEDICARE

## 2023-06-17 LAB
ALBUMIN SERPL-MCNC: 3.8 G/DL (ref 3.4–5)
ANION GAP SERPL CALCULATED.3IONS-SCNC: 9 MMOL/L (ref 3–16)
BUN SERPL-MCNC: 46 MG/DL (ref 7–20)
CALCIUM SERPL-MCNC: 10 MG/DL (ref 8.3–10.6)
CHLORIDE SERPL-SCNC: 104 MMOL/L (ref 99–110)
CO2 SERPL-SCNC: 26 MMOL/L (ref 21–32)
CREAT SERPL-MCNC: 1.3 MG/DL (ref 0.6–1.2)
DEPRECATED RDW RBC AUTO: 14.6 % (ref 12.4–15.4)
GFR SERPLBLD CREATININE-BSD FMLA CKD-EPI: 44 ML/MIN/{1.73_M2}
GLUCOSE BLD-MCNC: 141 MG/DL (ref 70–99)
GLUCOSE BLD-MCNC: 148 MG/DL (ref 70–99)
GLUCOSE BLD-MCNC: 196 MG/DL (ref 70–99)
GLUCOSE BLD-MCNC: 264 MG/DL (ref 70–99)
GLUCOSE SERPL-MCNC: 126 MG/DL (ref 70–99)
HCT VFR BLD AUTO: 33.1 % (ref 36–48)
HGB BLD-MCNC: 10.9 G/DL (ref 12–16)
MCH RBC QN AUTO: 34.2 PG (ref 26–34)
MCHC RBC AUTO-ENTMCNC: 33 G/DL (ref 31–36)
MCV RBC AUTO: 103.9 FL (ref 80–100)
PERFORMED ON: ABNORMAL
PHOSPHATE SERPL-MCNC: 3.5 MG/DL (ref 2.5–4.9)
PLATELET # BLD AUTO: 119 K/UL (ref 135–450)
PMV BLD AUTO: 10.1 FL (ref 5–10.5)
POTASSIUM SERPL-SCNC: 4.5 MMOL/L (ref 3.5–5.1)
RBC # BLD AUTO: 3.19 M/UL (ref 4–5.2)
SODIUM SERPL-SCNC: 139 MMOL/L (ref 136–145)
WBC # BLD AUTO: 5.3 K/UL (ref 4–11)

## 2023-06-17 PROCEDURE — 6370000000 HC RX 637 (ALT 250 FOR IP): Performed by: INTERNAL MEDICINE

## 2023-06-17 PROCEDURE — 6370000000 HC RX 637 (ALT 250 FOR IP): Performed by: HOSPITALIST

## 2023-06-17 PROCEDURE — 2580000003 HC RX 258: Performed by: PHYSICIAN ASSISTANT

## 2023-06-17 PROCEDURE — 3209999900 CT LUMBAR SPINE TRAUMA RECONSTRUCTION

## 2023-06-17 PROCEDURE — 1200000000 HC SEMI PRIVATE

## 2023-06-17 PROCEDURE — 80069 RENAL FUNCTION PANEL: CPT

## 2023-06-17 PROCEDURE — 97530 THERAPEUTIC ACTIVITIES: CPT

## 2023-06-17 PROCEDURE — 74176 CT ABD & PELVIS W/O CONTRAST: CPT

## 2023-06-17 PROCEDURE — 6370000000 HC RX 637 (ALT 250 FOR IP): Performed by: PHYSICIAN ASSISTANT

## 2023-06-17 PROCEDURE — 6370000000 HC RX 637 (ALT 250 FOR IP): Performed by: NURSE PRACTITIONER

## 2023-06-17 PROCEDURE — 85027 COMPLETE CBC AUTOMATED: CPT

## 2023-06-17 PROCEDURE — 97535 SELF CARE MNGMENT TRAINING: CPT

## 2023-06-17 RX ORDER — LIDOCAINE 4 G/G
1 PATCH TOPICAL DAILY
Status: DISCONTINUED | OUTPATIENT
Start: 2023-06-17 | End: 2023-06-19 | Stop reason: HOSPADM

## 2023-06-17 RX ORDER — SODIUM CHLORIDE 9 MG/ML
INJECTION, SOLUTION INTRAVENOUS CONTINUOUS
Status: ACTIVE | OUTPATIENT
Start: 2023-06-17 | End: 2023-06-17

## 2023-06-17 RX ADMIN — FENOFIBRATE 160 MG: 160 TABLET ORAL at 09:03

## 2023-06-17 RX ADMIN — PANTOPRAZOLE SODIUM 40 MG: 40 TABLET, DELAYED RELEASE ORAL at 15:32

## 2023-06-17 RX ADMIN — METHOCARBAMOL 750 MG: 500 TABLET ORAL at 17:31

## 2023-06-17 RX ADMIN — Medication 125 MG: at 06:12

## 2023-06-17 RX ADMIN — ALLOPURINOL 100 MG: 100 TABLET ORAL at 09:03

## 2023-06-17 RX ADMIN — METHOCARBAMOL 750 MG: 500 TABLET ORAL at 12:10

## 2023-06-17 RX ADMIN — SODIUM CHLORIDE: 9 INJECTION, SOLUTION INTRAVENOUS at 17:36

## 2023-06-17 RX ADMIN — ASPIRIN 325 MG: 325 TABLET ORAL at 20:35

## 2023-06-17 RX ADMIN — ACETAMINOPHEN 650 MG: 325 TABLET ORAL at 17:36

## 2023-06-17 RX ADMIN — ACETAMINOPHEN 650 MG: 325 TABLET ORAL at 09:03

## 2023-06-17 RX ADMIN — LEVOTHYROXINE SODIUM 88 MCG: 0.09 TABLET ORAL at 06:12

## 2023-06-17 RX ADMIN — METOPROLOL SUCCINATE 100 MG: 50 TABLET, EXTENDED RELEASE ORAL at 09:02

## 2023-06-17 RX ADMIN — ACETAMINOPHEN 650 MG: 325 TABLET ORAL at 01:30

## 2023-06-17 RX ADMIN — OXYCODONE HYDROCHLORIDE 5 MG: 5 TABLET ORAL at 15:32

## 2023-06-17 RX ADMIN — METHOCARBAMOL 750 MG: 500 TABLET ORAL at 09:02

## 2023-06-17 RX ADMIN — Medication 125 MG: at 17:31

## 2023-06-17 RX ADMIN — OXYCODONE HYDROCHLORIDE 5 MG: 5 TABLET ORAL at 20:45

## 2023-06-17 RX ADMIN — METHOCARBAMOL 750 MG: 500 TABLET ORAL at 20:35

## 2023-06-17 RX ADMIN — PANTOPRAZOLE SODIUM 40 MG: 40 TABLET, DELAYED RELEASE ORAL at 06:12

## 2023-06-17 RX ADMIN — Medication 125 MG: at 12:10

## 2023-06-17 RX ADMIN — SERTRALINE 50 MG: 50 TABLET, FILM COATED ORAL at 09:03

## 2023-06-17 RX ADMIN — ATORVASTATIN CALCIUM 20 MG: 20 TABLET, FILM COATED ORAL at 20:35

## 2023-06-17 RX ADMIN — Medication 125 MG: at 00:21

## 2023-06-17 ASSESSMENT — PAIN SCALES - GENERAL
PAINLEVEL_OUTOF10: 2
PAINLEVEL_OUTOF10: 0
PAINLEVEL_OUTOF10: 3
PAINLEVEL_OUTOF10: 3
PAINLEVEL_OUTOF10: 4
PAINLEVEL_OUTOF10: 4

## 2023-06-17 ASSESSMENT — PAIN DESCRIPTION - LOCATION
LOCATION: BACK;ABDOMEN
LOCATION: ARM
LOCATION: BACK;ABDOMEN

## 2023-06-17 ASSESSMENT — PAIN DESCRIPTION - DESCRIPTORS: DESCRIPTORS: ACHING

## 2023-06-17 ASSESSMENT — PAIN SCALES - WONG BAKER: WONGBAKER_NUMERICALRESPONSE: 2

## 2023-06-17 ASSESSMENT — PAIN DESCRIPTION - ORIENTATION: ORIENTATION: LEFT

## 2023-06-18 ENCOUNTER — APPOINTMENT (OUTPATIENT)
Dept: GENERAL RADIOLOGY | Age: 73
DRG: 690 | End: 2023-06-18
Attending: INTERNAL MEDICINE
Payer: MEDICARE

## 2023-06-18 LAB
ALBUMIN SERPL-MCNC: 3.5 G/DL (ref 3.4–5)
ANION GAP SERPL CALCULATED.3IONS-SCNC: 8 MMOL/L (ref 3–16)
BACTERIA BLD CULT ORG #2: NORMAL
BACTERIA BLD CULT: NORMAL
BASOPHILS # BLD: 0 K/UL (ref 0–0.2)
BASOPHILS NFR BLD: 0.6 %
BUN SERPL-MCNC: 41 MG/DL (ref 7–20)
CALCIUM SERPL-MCNC: 9.8 MG/DL (ref 8.3–10.6)
CHLORIDE SERPL-SCNC: 107 MMOL/L (ref 99–110)
CO2 SERPL-SCNC: 26 MMOL/L (ref 21–32)
CREAT SERPL-MCNC: 1.1 MG/DL (ref 0.6–1.2)
DEPRECATED RDW RBC AUTO: 14.7 % (ref 12.4–15.4)
EOSINOPHIL # BLD: 0.1 K/UL (ref 0–0.6)
EOSINOPHIL NFR BLD: 2.8 %
GFR SERPLBLD CREATININE-BSD FMLA CKD-EPI: 54 ML/MIN/{1.73_M2}
GLUCOSE BLD-MCNC: 125 MG/DL (ref 70–99)
GLUCOSE BLD-MCNC: 177 MG/DL (ref 70–99)
GLUCOSE BLD-MCNC: 214 MG/DL (ref 70–99)
GLUCOSE BLD-MCNC: 239 MG/DL (ref 70–99)
GLUCOSE SERPL-MCNC: 155 MG/DL (ref 70–99)
HCT VFR BLD AUTO: 32.3 % (ref 36–48)
HGB BLD-MCNC: 10.7 G/DL (ref 12–16)
LYMPHOCYTES # BLD: 0.8 K/UL (ref 1–5.1)
LYMPHOCYTES NFR BLD: 15.8 %
MCH RBC QN AUTO: 34.3 PG (ref 26–34)
MCHC RBC AUTO-ENTMCNC: 33.1 G/DL (ref 31–36)
MCV RBC AUTO: 103.4 FL (ref 80–100)
MONOCYTES # BLD: 0.3 K/UL (ref 0–1.3)
MONOCYTES NFR BLD: 5.6 %
NEUTROPHILS # BLD: 3.7 K/UL (ref 1.7–7.7)
NEUTROPHILS NFR BLD: 75.2 %
NT-PROBNP SERPL-MCNC: 1544 PG/ML (ref 0–124)
PERFORMED ON: ABNORMAL
PHOSPHATE SERPL-MCNC: 2.7 MG/DL (ref 2.5–4.9)
PLATELET # BLD AUTO: 110 K/UL (ref 135–450)
PMV BLD AUTO: 9.6 FL (ref 5–10.5)
POTASSIUM SERPL-SCNC: 4.6 MMOL/L (ref 3.5–5.1)
RBC # BLD AUTO: 3.13 M/UL (ref 4–5.2)
SODIUM SERPL-SCNC: 141 MMOL/L (ref 136–145)
WBC # BLD AUTO: 4.9 K/UL (ref 4–11)

## 2023-06-18 PROCEDURE — 94640 AIRWAY INHALATION TREATMENT: CPT

## 2023-06-18 PROCEDURE — 6370000000 HC RX 637 (ALT 250 FOR IP): Performed by: PHYSICIAN ASSISTANT

## 2023-06-18 PROCEDURE — 97530 THERAPEUTIC ACTIVITIES: CPT

## 2023-06-18 PROCEDURE — 1200000000 HC SEMI PRIVATE

## 2023-06-18 PROCEDURE — 6370000000 HC RX 637 (ALT 250 FOR IP): Performed by: NURSE PRACTITIONER

## 2023-06-18 PROCEDURE — 36415 COLL VENOUS BLD VENIPUNCTURE: CPT

## 2023-06-18 PROCEDURE — 83880 ASSAY OF NATRIURETIC PEPTIDE: CPT

## 2023-06-18 PROCEDURE — 6360000002 HC RX W HCPCS: Performed by: PHYSICIAN ASSISTANT

## 2023-06-18 PROCEDURE — 71045 X-RAY EXAM CHEST 1 VIEW: CPT

## 2023-06-18 PROCEDURE — 94761 N-INVAS EAR/PLS OXIMETRY MLT: CPT

## 2023-06-18 PROCEDURE — 2700000000 HC OXYGEN THERAPY PER DAY

## 2023-06-18 PROCEDURE — 97535 SELF CARE MNGMENT TRAINING: CPT

## 2023-06-18 PROCEDURE — 80069 RENAL FUNCTION PANEL: CPT

## 2023-06-18 PROCEDURE — 85025 COMPLETE CBC W/AUTO DIFF WBC: CPT

## 2023-06-18 PROCEDURE — 6370000000 HC RX 637 (ALT 250 FOR IP): Performed by: HOSPITALIST

## 2023-06-18 PROCEDURE — 6370000000 HC RX 637 (ALT 250 FOR IP): Performed by: INTERNAL MEDICINE

## 2023-06-18 RX ORDER — ENOXAPARIN SODIUM 100 MG/ML
40 INJECTION SUBCUTANEOUS DAILY
Status: DISCONTINUED | OUTPATIENT
Start: 2023-06-18 | End: 2023-06-19 | Stop reason: HOSPADM

## 2023-06-18 RX ORDER — IPRATROPIUM BROMIDE AND ALBUTEROL SULFATE 2.5; .5 MG/3ML; MG/3ML
1 SOLUTION RESPIRATORY (INHALATION)
Status: DISCONTINUED | OUTPATIENT
Start: 2023-06-18 | End: 2023-06-19 | Stop reason: HOSPADM

## 2023-06-18 RX ORDER — FUROSEMIDE 10 MG/ML
20 INJECTION INTRAMUSCULAR; INTRAVENOUS ONCE
Status: COMPLETED | OUTPATIENT
Start: 2023-06-18 | End: 2023-06-18

## 2023-06-18 RX ADMIN — ENOXAPARIN SODIUM 40 MG: 100 INJECTION SUBCUTANEOUS at 10:17

## 2023-06-18 RX ADMIN — METHOCARBAMOL 750 MG: 500 TABLET ORAL at 08:58

## 2023-06-18 RX ADMIN — Medication 125 MG: at 06:53

## 2023-06-18 RX ADMIN — ALLOPURINOL 100 MG: 100 TABLET ORAL at 08:58

## 2023-06-18 RX ADMIN — Medication 125 MG: at 02:46

## 2023-06-18 RX ADMIN — METHOCARBAMOL 750 MG: 500 TABLET ORAL at 16:36

## 2023-06-18 RX ADMIN — OXYCODONE HYDROCHLORIDE 5 MG: 5 TABLET ORAL at 11:49

## 2023-06-18 RX ADMIN — SERTRALINE 50 MG: 50 TABLET, FILM COATED ORAL at 08:58

## 2023-06-18 RX ADMIN — ACETAMINOPHEN 650 MG: 325 TABLET ORAL at 16:36

## 2023-06-18 RX ADMIN — IPRATROPIUM BROMIDE AND ALBUTEROL SULFATE 1 DOSE: .5; 3 SOLUTION RESPIRATORY (INHALATION) at 15:23

## 2023-06-18 RX ADMIN — ATORVASTATIN CALCIUM 20 MG: 20 TABLET, FILM COATED ORAL at 20:05

## 2023-06-18 RX ADMIN — PANTOPRAZOLE SODIUM 40 MG: 40 TABLET, DELAYED RELEASE ORAL at 16:37

## 2023-06-18 RX ADMIN — IPRATROPIUM BROMIDE AND ALBUTEROL SULFATE 1 DOSE: .5; 3 SOLUTION RESPIRATORY (INHALATION) at 10:21

## 2023-06-18 RX ADMIN — FUROSEMIDE 20 MG: 10 INJECTION, SOLUTION INTRAMUSCULAR; INTRAVENOUS at 10:16

## 2023-06-18 RX ADMIN — METOPROLOL SUCCINATE 100 MG: 50 TABLET, EXTENDED RELEASE ORAL at 08:59

## 2023-06-18 RX ADMIN — LEVOTHYROXINE SODIUM 88 MCG: 0.09 TABLET ORAL at 06:53

## 2023-06-18 RX ADMIN — FENOFIBRATE 160 MG: 160 TABLET ORAL at 08:58

## 2023-06-18 RX ADMIN — Medication 125 MG: at 11:45

## 2023-06-18 RX ADMIN — METHOCARBAMOL 750 MG: 500 TABLET ORAL at 20:05

## 2023-06-18 RX ADMIN — IPRATROPIUM BROMIDE AND ALBUTEROL SULFATE 1 DOSE: .5; 3 SOLUTION RESPIRATORY (INHALATION) at 21:42

## 2023-06-18 RX ADMIN — ACETAMINOPHEN 650 MG: 325 TABLET ORAL at 02:46

## 2023-06-18 RX ADMIN — INSULIN LISPRO 2 UNITS: 100 INJECTION, SOLUTION INTRAVENOUS; SUBCUTANEOUS at 16:36

## 2023-06-18 RX ADMIN — METHOCARBAMOL 750 MG: 500 TABLET ORAL at 11:45

## 2023-06-18 RX ADMIN — Medication 125 MG: at 16:37

## 2023-06-18 RX ADMIN — ASPIRIN 325 MG: 325 TABLET ORAL at 20:05

## 2023-06-18 RX ADMIN — PANTOPRAZOLE SODIUM 40 MG: 40 TABLET, DELAYED RELEASE ORAL at 06:53

## 2023-06-18 RX ADMIN — INSULIN LISPRO 2 UNITS: 100 INJECTION, SOLUTION INTRAVENOUS; SUBCUTANEOUS at 11:45

## 2023-06-18 RX ADMIN — ACETAMINOPHEN 650 MG: 325 TABLET ORAL at 08:58

## 2023-06-18 ASSESSMENT — PAIN SCALES - GENERAL
PAINLEVEL_OUTOF10: 0
PAINLEVEL_OUTOF10: 4
PAINLEVEL_OUTOF10: 8

## 2023-06-18 ASSESSMENT — PAIN DESCRIPTION - DESCRIPTORS: DESCRIPTORS: ACHING;DISCOMFORT

## 2023-06-18 ASSESSMENT — PAIN DESCRIPTION - LOCATION
LOCATION: BACK;SHOULDER
LOCATION: BACK;SHOULDER

## 2023-06-18 ASSESSMENT — PAIN DESCRIPTION - ORIENTATION
ORIENTATION: LEFT
ORIENTATION: LOWER;LEFT

## 2023-06-19 VITALS
HEIGHT: 66 IN | TEMPERATURE: 98.4 F | HEART RATE: 69 BPM | WEIGHT: 218.25 LBS | RESPIRATION RATE: 18 BRPM | OXYGEN SATURATION: 95 % | DIASTOLIC BLOOD PRESSURE: 74 MMHG | SYSTOLIC BLOOD PRESSURE: 137 MMHG | BODY MASS INDEX: 35.08 KG/M2

## 2023-06-19 LAB
ALBUMIN SERPL-MCNC: 3.9 G/DL (ref 3.4–5)
ANION GAP SERPL CALCULATED.3IONS-SCNC: 10 MMOL/L (ref 3–16)
BUN SERPL-MCNC: 36 MG/DL (ref 7–20)
CALCIUM SERPL-MCNC: 10.2 MG/DL (ref 8.3–10.6)
CHLORIDE SERPL-SCNC: 105 MMOL/L (ref 99–110)
CO2 SERPL-SCNC: 27 MMOL/L (ref 21–32)
CREAT SERPL-MCNC: 1 MG/DL (ref 0.6–1.2)
DEPRECATED RDW RBC AUTO: 14.3 % (ref 12.4–15.4)
GFR SERPLBLD CREATININE-BSD FMLA CKD-EPI: >60 ML/MIN/{1.73_M2}
GLUCOSE BLD-MCNC: 120 MG/DL (ref 70–99)
GLUCOSE BLD-MCNC: 126 MG/DL (ref 70–99)
GLUCOSE SERPL-MCNC: 175 MG/DL (ref 70–99)
HCT VFR BLD AUTO: 33.3 % (ref 36–48)
HGB BLD-MCNC: 10.9 G/DL (ref 12–16)
MCH RBC QN AUTO: 33.9 PG (ref 26–34)
MCHC RBC AUTO-ENTMCNC: 32.8 G/DL (ref 31–36)
MCV RBC AUTO: 103.5 FL (ref 80–100)
NT-PROBNP SERPL-MCNC: 1824 PG/ML (ref 0–124)
PERFORMED ON: ABNORMAL
PERFORMED ON: ABNORMAL
PHOSPHATE SERPL-MCNC: 2.7 MG/DL (ref 2.5–4.9)
PLATELET # BLD AUTO: 117 K/UL (ref 135–450)
PMV BLD AUTO: 9.2 FL (ref 5–10.5)
POTASSIUM SERPL-SCNC: 4.3 MMOL/L (ref 3.5–5.1)
RBC # BLD AUTO: 3.22 M/UL (ref 4–5.2)
SODIUM SERPL-SCNC: 142 MMOL/L (ref 136–145)
WBC # BLD AUTO: 5.8 K/UL (ref 4–11)

## 2023-06-19 PROCEDURE — 6370000000 HC RX 637 (ALT 250 FOR IP): Performed by: PHYSICIAN ASSISTANT

## 2023-06-19 PROCEDURE — 6370000000 HC RX 637 (ALT 250 FOR IP): Performed by: HOSPITALIST

## 2023-06-19 PROCEDURE — 80069 RENAL FUNCTION PANEL: CPT

## 2023-06-19 PROCEDURE — 6360000002 HC RX W HCPCS: Performed by: PHYSICIAN ASSISTANT

## 2023-06-19 PROCEDURE — 83880 ASSAY OF NATRIURETIC PEPTIDE: CPT

## 2023-06-19 PROCEDURE — 6370000000 HC RX 637 (ALT 250 FOR IP): Performed by: NURSE PRACTITIONER

## 2023-06-19 PROCEDURE — 94761 N-INVAS EAR/PLS OXIMETRY MLT: CPT

## 2023-06-19 PROCEDURE — 6370000000 HC RX 637 (ALT 250 FOR IP): Performed by: INTERNAL MEDICINE

## 2023-06-19 PROCEDURE — 85027 COMPLETE CBC AUTOMATED: CPT

## 2023-06-19 PROCEDURE — 97535 SELF CARE MNGMENT TRAINING: CPT

## 2023-06-19 PROCEDURE — 94640 AIRWAY INHALATION TREATMENT: CPT

## 2023-06-19 PROCEDURE — 97530 THERAPEUTIC ACTIVITIES: CPT

## 2023-06-19 RX ORDER — LIDOCAINE 4 G/G
1 PATCH TOPICAL DAILY
Qty: 30 EACH | Refills: 1 | Status: SHIPPED | OUTPATIENT
Start: 2023-06-20

## 2023-06-19 RX ORDER — FUROSEMIDE 10 MG/ML
40 INJECTION INTRAMUSCULAR; INTRAVENOUS ONCE
Status: COMPLETED | OUTPATIENT
Start: 2023-06-19 | End: 2023-06-19

## 2023-06-19 RX ORDER — VANCOMYCIN HYDROCHLORIDE 50 MG/ML
125 KIT ORAL EVERY 6 HOURS
Qty: 70 ML | Refills: 0 | Status: SHIPPED | OUTPATIENT
Start: 2023-06-19 | End: 2023-06-26

## 2023-06-19 RX ADMIN — FENOFIBRATE 160 MG: 160 TABLET ORAL at 08:37

## 2023-06-19 RX ADMIN — Medication 125 MG: at 06:15

## 2023-06-19 RX ADMIN — OXYCODONE HYDROCHLORIDE 5 MG: 5 TABLET ORAL at 13:24

## 2023-06-19 RX ADMIN — METHOCARBAMOL 750 MG: 500 TABLET ORAL at 08:36

## 2023-06-19 RX ADMIN — FUROSEMIDE 40 MG: 10 INJECTION, SOLUTION INTRAMUSCULAR; INTRAVENOUS at 09:20

## 2023-06-19 RX ADMIN — IPRATROPIUM BROMIDE AND ALBUTEROL SULFATE 1 DOSE: .5; 3 SOLUTION RESPIRATORY (INHALATION) at 09:04

## 2023-06-19 RX ADMIN — ALLOPURINOL 100 MG: 100 TABLET ORAL at 08:36

## 2023-06-19 RX ADMIN — OXYCODONE HYDROCHLORIDE 5 MG: 5 TABLET ORAL at 06:15

## 2023-06-19 RX ADMIN — Medication 125 MG: at 00:49

## 2023-06-19 RX ADMIN — ACETAMINOPHEN 650 MG: 325 TABLET ORAL at 08:37

## 2023-06-19 RX ADMIN — PANTOPRAZOLE SODIUM 40 MG: 40 TABLET, DELAYED RELEASE ORAL at 06:16

## 2023-06-19 RX ADMIN — Medication 125 MG: at 13:22

## 2023-06-19 RX ADMIN — ACETAMINOPHEN 650 MG: 325 TABLET ORAL at 00:49

## 2023-06-19 RX ADMIN — LEVOTHYROXINE SODIUM 88 MCG: 0.09 TABLET ORAL at 06:16

## 2023-06-19 RX ADMIN — METHOCARBAMOL 750 MG: 500 TABLET ORAL at 13:22

## 2023-06-19 RX ADMIN — SERTRALINE 50 MG: 50 TABLET, FILM COATED ORAL at 08:36

## 2023-06-19 RX ADMIN — ENOXAPARIN SODIUM 40 MG: 100 INJECTION SUBCUTANEOUS at 08:36

## 2023-06-19 RX ADMIN — METOPROLOL SUCCINATE 100 MG: 50 TABLET, EXTENDED RELEASE ORAL at 08:36

## 2023-06-19 ASSESSMENT — PAIN SCALES - GENERAL
PAINLEVEL_OUTOF10: 6
PAINLEVEL_OUTOF10: 4
PAINLEVEL_OUTOF10: 4

## 2023-06-19 ASSESSMENT — PAIN DESCRIPTION - ORIENTATION: ORIENTATION: LEFT

## 2023-06-19 ASSESSMENT — PAIN SCALES - WONG BAKER: WONGBAKER_NUMERICALRESPONSE: 2

## 2023-06-19 ASSESSMENT — PAIN DESCRIPTION - DESCRIPTORS: DESCRIPTORS: ACHING

## 2023-06-19 ASSESSMENT — PAIN DESCRIPTION - LOCATION
LOCATION: BACK
LOCATION: BACK;SHOULDER

## 2023-06-19 NOTE — CARE COORDINATION
Discharge Plan:     Patient discharged to: [facility].  [place], [address]    SW/DC Planner faxed, [address] and PAZ to: [phone]    Narcotic Prescriptions faxed were: yes    RN: will call report to:  [phone]    Medical Transport with: [facility] [phone]     time: 26    Family advised of discharge?: yes, Tammy Washburn, nephbindu FRANKLIN Submitted?:  N/A    All discharge needs met per case management.     NORMA SheaN RN    [facility]  Phone: [phone]

## 2023-06-19 NOTE — PROGRESS NOTES
IV removed, report given to Guillermo Senior, charge nurse at rehab facility.
Patient discharged by stretcher to rehab facility. Discharge instructions and medications reviewed with patient. Patient voices understanding and denies further questions/ concerns at this time.
Patient is in the bed with call light in reach, medications given per [facility], shift assessment completed. The care plan and education has been reviewed and mutually agreed upon with the patient.
Interventions: not applicable and repositioned         Activities of Daily Living  Basic Activities of Daily Living  Grooming: stand by assistance  Upper Extremity Bathing: minimal assistance  Upper Extremity Dressing: moderate assistance  Lower Extremity Dressing: setup assistance supervision  Dressing Comments: doff and don  socks seated in recliner, assist to adjust sling properly and assist with donning new gown    Instrumental Activities of Daily Living  No IADL completed on this date. Functional Mobility  Bed Mobility  Sit to Supine: stand by assistance  Scooting: stand by assistance    Transfers  Sit to stand transfer:stand by assistance  Stand to sit transfer: stand by assistance      Functional Mobility:  Sitting Balance: modified independent. Standing Balance: stand by assistance.     Functional Mobility: .  stand by assistance  Functional Mobility Activity: to/from bathroom, ~40 ft in room transporting items and ambulating to  items from floor  Functional Mobility Device Use: no device  Functional Mobility Comment: No LOB    Other Therapeutic Interventions  See General Comments  Functional Outcomes  -PAC Inpatient Daily Activity Raw Score: 17    Cognition  Overall Cognitive Status: Impaired  Safety Judgement: decreased awareness of need for assistance, decreased awareness of need for safety  Problem Solving: assistance required to implement solutions, good awareness of errors made, assistance required to identify errors made  Insights: decreased awareness of deficits  Orientation:    alert and oriented x 4  Command Following:   accurately follows one step commands     Education  Barriers To Learning: cognition  Patient Education: patient educated on OT role and benefits, plan of care, ADL adaptive strategies, IADL safety, proper use of assistive device/equipment, transfer training, discharge recommendations  Learning Assessment:  patient verbalizes understanding, would benefit from

## 2023-06-19 NOTE — DISCHARGE SUMMARY
cardiology. Discharge Exam:    /74   Pulse 69   Temp 98.4 °F (36.9 °C) (Oral)   Resp 18   Ht 5' 6\" (1.676 m)   Wt 218 lb 4 oz (99 kg)   SpO2 95%   BMI 35.23 kg/m²   General appearance:  Appears comfortable. AAOx3  HEENT: atraumatic, Pupils equal, muscous membranes moist, no masses appreciated  Cardiovascular: Regular rate and rhythm no murmurs appreciated  Respiratory: CTAB no wheezing  Gastrointestinal: Abdomen soft, non-tender, BS+  EXT: L arm in long arm splint, sling  Neurology: no gross focal deficts  Psychiatry: Appropriate affect. Not agitated  Skin: Warm, dry, no rashes appreciated    Discharge Medications:   Current Discharge Medication List        START taking these medications    Details   lidocaine 4 % external patch Place 1 patch onto the skin daily  Qty: 30 each, Refills: 1      vancomycin (FIRVANQ) 50 MG/ML SOLR oral solution Take 2.5 mLs by mouth in the morning and 2.5 mLs at noon and 2.5 mLs in the evening and 2.5 mLs before bedtime. Do all this for 28 doses. Qty: 70 mL, Refills: 0      aspirin (ASPIRIN CHILDRENS) 81 MG chewable tablet Take 1 tablet by mouth daily  Qty: 30 tablet, Refills: 3      methocarbamol (ROBAXIN) 750 MG tablet Take 1 tablet by mouth 4 times daily for 5 days  Qty: 20 tablet, Refills: 0      oxyCODONE (ROXICODONE) 5 MG immediate release tablet Take 1 tablet by mouth every 8 hours as needed for Pain for up to 3 days.  Max Daily Amount: 15 mg  Qty: 9 tablet, Refills: 0    Comments: Reduce doses taken as pain becomes manageable  Associated Diagnoses: Closed nondisplaced oblique fracture of shaft of left humerus, initial encounter      guaiFENesin (MUCINEX) 600 MG extended release tablet Take 1 tablet by mouth 2 times daily for 7 days  Qty: 14 tablet, Refills: 0           Current Discharge Medication List        Current Discharge Medication List        CONTINUE these medications which have NOT CHANGED    Details   ferrous sulfate (IRON 325) 325 (65 Fe) MG tablet

## 2023-07-14 PROBLEM — N39.0 UTI (URINARY TRACT INFECTION): Status: RESOLVED | Noted: 2023-06-14 | Resolved: 2023-07-14
